# Patient Record
Sex: MALE | Race: BLACK OR AFRICAN AMERICAN | NOT HISPANIC OR LATINO | URBAN - METROPOLITAN AREA
[De-identification: names, ages, dates, MRNs, and addresses within clinical notes are randomized per-mention and may not be internally consistent; named-entity substitution may affect disease eponyms.]

---

## 2018-09-17 ENCOUNTER — EMERGENCY (EMERGENCY)
Facility: HOSPITAL | Age: 33
LOS: 1 days | Discharge: HOME | End: 2018-09-17
Attending: EMERGENCY MEDICINE | Admitting: EMERGENCY MEDICINE
Payer: OTHER MISCELLANEOUS

## 2018-09-17 VITALS
DIASTOLIC BLOOD PRESSURE: 89 MMHG | TEMPERATURE: 98 F | HEART RATE: 85 BPM | RESPIRATION RATE: 18 BRPM | SYSTOLIC BLOOD PRESSURE: 147 MMHG | OXYGEN SATURATION: 98 %

## 2018-09-17 PROCEDURE — 99282 EMERGENCY DEPT VISIT SF MDM: CPT

## 2018-09-17 RX ORDER — IBUPROFEN 200 MG
600 TABLET ORAL ONCE
Qty: 0 | Refills: 0 | Status: COMPLETED | OUTPATIENT
Start: 2018-09-17 | End: 2018-09-17

## 2018-09-17 RX ADMIN — Medication 600 MILLIGRAM(S): at 13:08

## 2018-09-17 NOTE — ED PROVIDER NOTE - MEDICAL DECISION MAKING DETAILS
32 yo M with RLE pain with msk pain beginning after twisting motion while at work. Low suspicion for DVT or fx. Will treat with antiinflammatory and outpatient f/u.

## 2018-09-17 NOTE — ED PROVIDER NOTE - NEUROLOGICAL, MLM
Alert and oriented, no focal deficits, no motor or sensory deficits. Motor 5/5 RLE, distal sensory grossly intact x RLE, normal gait.

## 2018-09-17 NOTE — ED PROVIDER NOTE - OBJECTIVE STATEMENT
34 yo M presents with RLE pain that began at 8AM today. Pain began when patient twisted abruptly. Pain is located on back of thigh. No pain medications taken. Denies numbness/tingling, back pain, pain radiation, change in bowel movement, difficulty urinating, fevers/chills, weakness. NKDA. 32 yo M presents with RLE pain that began at 8AM today. Pain began when patient twisted abruptly at work. Pain is located on back of right thigh. No pain medications taken. Denies numbness/tingling, back pain, pain radiation, change in bowel movement, difficulty urinating, fevers/chills, weakness. NKDA.

## 2018-09-17 NOTE — ED PROVIDER NOTE - LOWER EXTREMITY EXAM, RIGHT
RLE normal appearing, posterior thigh mild TTP medially extending into posterior knee. Gait intact with pain. no swelling to either lower exteremity.

## 2020-02-29 ENCOUNTER — EMERGENCY (EMERGENCY)
Facility: HOSPITAL | Age: 35
LOS: 0 days | Discharge: HOME | End: 2020-02-29
Payer: COMMERCIAL

## 2020-02-29 VITALS
OXYGEN SATURATION: 99 % | SYSTOLIC BLOOD PRESSURE: 136 MMHG | WEIGHT: 274.92 LBS | HEIGHT: 68 IN | TEMPERATURE: 98 F | RESPIRATION RATE: 18 BRPM | HEART RATE: 91 BPM | DIASTOLIC BLOOD PRESSURE: 71 MMHG

## 2020-02-29 DIAGNOSIS — M54.5 LOW BACK PAIN: ICD-10-CM
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
## 2020-02-29 DIAGNOSIS — R51 HEADACHE: ICD-10-CM

## 2020-02-29 PROCEDURE — 99283 EMERGENCY DEPT VISIT LOW MDM: CPT

## 2020-02-29 RX ORDER — IBUPROFEN 200 MG
600 TABLET ORAL ONCE
Refills: 0 | Status: COMPLETED | OUTPATIENT
Start: 2020-02-29 | End: 2020-02-29

## 2020-02-29 RX ORDER — METHOCARBAMOL 500 MG/1
750 TABLET, FILM COATED ORAL ONCE
Refills: 0 | Status: COMPLETED | OUTPATIENT
Start: 2020-02-29 | End: 2020-02-29

## 2020-02-29 RX ADMIN — METHOCARBAMOL 750 MILLIGRAM(S): 500 TABLET, FILM COATED ORAL at 21:26

## 2020-02-29 RX ADMIN — Medication 600 MILLIGRAM(S): at 21:26

## 2020-02-29 NOTE — ED PROVIDER NOTE - PHYSICAL EXAMINATION
NAD, ambulatory with steady gait.  scalp= no STS, no TTP  neck- mild b/l trapezius tenderness, no midline tenderness  spine- b/l lumbar, lower thoracic tenderness. no midline tenderness. no swelling/ecchymosis/rash.  AA&O x 3, CN II-XII grossly intact. PERRL, EOM intact. Mus strength 5/5 in UE and LE b/l. Sensation grossly intact. Finger to nose WNL. Gait steady. Neg pronator, neg romberg.
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
## 2020-02-29 NOTE — ED PROVIDER NOTE - PATIENT PORTAL LINK FT
You can access the FollowMyHealth Patient Portal offered by [facility] by registering at the following website: [email or web address]. By joining [facility]’s FollowMyHealth portal, you will also be able to view your health information using other applications (apps) compatible with our system.
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
## 2020-02-29 NOTE — ED ADULT NURSE NOTE - NSIMPLEMENTINTERV_GEN_ALL_ED
Implemented All Universal Safety Interventions:  [place] to call system. Call bell, personal items and telephone within reach. Instruct patient to call for assistance. Room bathroom lighting operational. Non-slip footwear when patient is off stretcher. Physically safe environment: no spills, clutter or unnecessary equipment. Stretcher in lowest position, wheels locked, appropriate side rails in place.
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
